# Patient Record
Sex: FEMALE | Race: WHITE | URBAN - METROPOLITAN AREA
[De-identification: names, ages, dates, MRNs, and addresses within clinical notes are randomized per-mention and may not be internally consistent; named-entity substitution may affect disease eponyms.]

---

## 2017-05-12 ENCOUNTER — OFFICE VISIT (OUTPATIENT)
Dept: URGENT CARE | Facility: RETAIL CLINIC | Age: 57
End: 2017-05-12
Payer: COMMERCIAL

## 2017-05-12 VITALS
HEART RATE: 80 BPM | OXYGEN SATURATION: 98 % | TEMPERATURE: 97.4 F | DIASTOLIC BLOOD PRESSURE: 85 MMHG | SYSTOLIC BLOOD PRESSURE: 154 MMHG

## 2017-05-12 DIAGNOSIS — J20.9 ACUTE BRONCHITIS WITH COEXISTING CONDITION REQUIRING PROPHYLACTIC TREATMENT: ICD-10-CM

## 2017-05-12 DIAGNOSIS — R05.9 COUGH: ICD-10-CM

## 2017-05-12 DIAGNOSIS — J34.89 SINUS PRESSURE: Primary | ICD-10-CM

## 2017-05-12 PROCEDURE — 99213 OFFICE O/P EST LOW 20 MIN: CPT | Performed by: NURSE PRACTITIONER

## 2017-05-12 RX ORDER — CEFDINIR 300 MG/1
300 CAPSULE ORAL 2 TIMES DAILY
Qty: 20 CAPSULE | Refills: 0 | Status: SHIPPED | OUTPATIENT
Start: 2017-05-12 | End: 2017-05-22

## 2017-05-12 RX ORDER — PREDNISONE 20 MG/1
40 TABLET ORAL DAILY
Qty: 10 TABLET | Refills: 0 | Status: SHIPPED | OUTPATIENT
Start: 2017-05-12 | End: 2017-05-17

## 2017-05-12 NOTE — NURSING NOTE
Chief Complaint   Patient presents with     Sinus Problem     states that she gets this every year.  x9 days.  low grade fever, sweats and chills.       Initial /85 (BP Location: Right arm, Patient Position: Chair, Cuff Size: Adult Large)  Pulse 80  Temp 97.4  F (36.3  C) (Tympanic)  LMP 11/22/2004  SpO2 98% Estimated body mass index is 35.54 kg/(m^2) as calculated from the following:    Height as of 9/3/14: 5' (1.524 m).    Weight as of 9/22/14: 182 lb (82.6 kg).  Medication Reconciliation: complete   Dalia Beard, CMA

## 2017-05-12 NOTE — PROGRESS NOTES
[facility] clinic note    SUBJECTIVE:    Yue Jorgensen is a 56 year old female who presents to [facility] clinic with the following symptoms:  Facial pain for seven days or more  Purulent nasal discharge  Failure of over-the-counter nasal decongestants or other medications  Headache  History of sinusitis in the past  Mild to moderate facial swelling  Pain in the teeth or near a certain tooth  Ear pain, Neck hurts  Cough   Myalgias, Malaise  Feverish, Chills    The patient denies a history of:  Fever >102.5  Orbital pain  Periorbital swelling or erythema  Severe facial swelling or erythema  Severe neck pain  Vision changes    PAST MEDICAL HISTORY:   Past Medical History:   Diagnosis Date     Acute bronchitis 1996     Closed fracture of unspecified part of fibula 1986     Hordeolum externum 10/14/1994     Other symptoms referable to back 1995    contusion of the coccyx     Unspecified multiple gestation, delivered       2 vag deliveries     Vaginitis and vulvovaginitis, unspecified 1992       PAST SURGICAL HISTORY:   Past Surgical History:   Procedure Laterality Date     C AFF FOREARM/WRIST SURGERY UNLISTED      Wrist surgery     C AFF FOREARM/WRIST SURGERY UNLISTED  07    Wrist fusion using [employer] wrist fusion plate     C LIGATE FALLOPIAN TUBE       C TOTAL ABD HYSTERECTOMY+BLAD REPR  2004     HC REMOVAL DEEP IMPLANT  07       FAMILY HISTORY:   Family History   Problem Relation Age of Onset     Respiratory Mother      COPD     CANCER Father      Esophageal cancer  age72       SOCIAL HISTORY:   Social History   Substance Use Topics     Smoking status: Current Every Day Smoker     Packs/day: 1.00     Years: 25.00     Types: Cigarettes     Smokeless tobacco: Not on file      Comment: pt uses to smoke 2ppd     Alcohol use Yes      Comment: occ       Current Outpatient Prescriptions   Medication     cefdinir (OMNICEF) 300 MG  capsule     predniSONE (DELTASONE) 20 MG tablet     ORDER FOR DME     No current facility-administered medications for this visit.        OBJECTIVE:  This patient appears today in in moderate distress.  Vitals:    05/12/17 1302   BP: 154/85   BP Location: Right arm   Patient Position: Chair   Cuff Size: Adult Large   Pulse: 80   Temp: 97.4  F (36.3  C)   TempSrc: Tympanic   SpO2: 98%     HEENT:  Facial tenderness located over the maxillary sinus region       Tenderness is bilateral.  Purulent nasal discharge present from both sides.  Tympanic membranes are clear and without bulging or erythema  Extraoccular movements are free and intact  Sclera, cornea and conjunctiva are clear  No proptosis  No significant periorbital edema or erythema  Throat is clear without significant erythema, tonsillar swelling or exudates  NECK:  Soft and supple without significant tenderness or adenopathy  RESP:  Auscultation with wheezing, mostly in the bases    ASSESSMENT:     Sinus pressure  Cough  Acute bronchitis with coexisting condition requiring prophylactic treatment      PLAN:  Current Outpatient Prescriptions   Medication     cefdinir (OMNICEF) 300 MG capsule     predniSONE (DELTASONE) 20 MG tablet     ORDER FOR DME     No current facility-administered medications for this visit.      Afrin nasal spray as needed for up to 3 days.  Apply warm facial packs for 5-10 minutes three times daily.  Attempt to quit smoking.  Drink plenty of fluids- 6 to 10 glasses of liquids each day.  Elevate head of the bed.  Increase the humidity level in the home.  Rest.  Saline drops or nasal sprays as needed.  Tylenol or ibuprofen as needed for discomfort.  Contact primary care clinic for increasing pain, high fever, vision changes, worsening symptoms, or no relief from symptoms after 7-10 days.  May drink tea /c honey to sooth throat.    Symptomatic treatment or other home remedies as discussed & reviewed.   Use of a nasal flush discussed with  Yue Jorgensen as a good treatment option.   OTC Mucinex (or generic) may help to loosen congestion as well.  Rest as needed.  Avoid items which you are or maybe allergic.  Add moisture to the air with a humidifier or a vaporizer &/or inhale steam from a basin of hot water or shower.  Follow up at:  [facility] [place] [phone]    Pedro Luis Davis MSN, APRN, Family NP-C  [facility]

## 2017-05-12 NOTE — MR AVS SNAPSHOT
"              After Visit Summary   2017    Yue Jorgensen    MRN: [id number]           Patient Information     Date Of Birth          1960        Visit Information        Provider Department      2017 1:00 PM Pedro Luis Davis APRN [facility]        Today's Diagnoses     Sinus pressure    -  1    Cough        Acute bronchitis with coexisting condition requiring prophylactic treatment           Follow-ups after your visit        Who to contact     You can reach your care team any time of the day by calling [phone].  Notification of test results:  If you have an abnormal lab result, we will notify you by phone as soon as possible.         Additional Information About Your Visit        MyChart Information     [employer] lets you send messages to your doctor, view your test results, renew your prescriptions, schedule appointments and more. To sign up, go to [email or web address] . Click on \"Log in\" on the left side of the screen, which will take you to the Welcome page. Then click on \"Sign up Now\" on the right side of the page.     You will be asked to enter the access code listed below, as well as some personal information. Please follow the directions to create your username and password.     Your access code is: [email or web address]  Expires: 8/10/2017  1:36 PM     Your access code will  in 90 days. If you need help or a new code, please call your [place] clinic or [phone].        Care EveryWhere ID     This is your [facility] EveryWhere ID. This could be used by other organizations to access your [facility] medical records  [id number]        Your Vitals Were     Pulse Temperature Last Period Pulse Oximetry          80 97.4  F (36.3  C) (Tympanic) 2004 98%         Blood Pressure from Last 3 Encounters:   17 154/85   12/21/15 (!) 138/95   14 117/83    Weight from Last 3 Encounters:   14 182 lb (82.6 kg)   14 181 lb 0.8 oz (82.1 kg)   14 " 190 lb (86.2 kg)              Today, you had the following     No orders found for display         Today's Medication Changes          These changes are accurate as of: 5/12/17  1:36 PM.  If you have any questions, ask your nurse or doctor.               Start taking these medicines.        Dose/Directions    cefdinir 300 MG capsule   Commonly known as:  OMNICEF   Used for:  Acute bronchitis with coexisting condition requiring prophylactic treatment   Started by:  Pedro Luis Davis APRN CNP        Dose:  300 mg   Take 1 capsule (300 mg) by mouth 2 times daily for 10 days   Quantity:  20 capsule   Refills:  0       predniSONE 20 MG tablet   Commonly known as:  DELTASONE   Used for:  Cough, Acute bronchitis with coexisting condition requiring prophylactic treatment   Started by:  Pedro Luis Davis APRN CNP        Dose:  40 mg   Take 2 tablets (40 mg) by mouth daily for 5 days   Quantity:  10 tablet   Refills:  0            Where to get your medicines      These medications were sent to [address]  [address] [facility] [address]     Phone:  [phone]     cefdinir 300 MG capsule    predniSONE 20 MG tablet                Primary Care Provider    None Specified       No primary provider on file.        Thank you!     Thank you for choosing [facility]  for your care. Our goal is always to provide you with excellent care. Hearing back from our patients is one way we can continue to improve our services. Please take a few minutes to complete the written survey that you may receive in the mail after your visit with us. Thank you!             Your Updated Medication List - Protect others around you: Learn how to safely use, store and throw away your medicines at www.disposemymeds.org.          This list is accurate as of: 5/12/17  1:36 PM.  Always use your most recent med list.                   Brand Name Dispense Instructions for use    cefdinir 300 MG  capsule    OMNICEF    20 capsule    Take 1 capsule (300 mg) by mouth 2 times daily for 10 days       order for DME     1 Device    Equipment being ordered: left wrist splint [id number]       predniSONE 20 MG tablet    DELTASONE    10 tablet    Take 2 tablets (40 mg) by mouth daily for 5 days